# Patient Record
Sex: FEMALE | URBAN - METROPOLITAN AREA
[De-identification: names, ages, dates, MRNs, and addresses within clinical notes are randomized per-mention and may not be internally consistent; named-entity substitution may affect disease eponyms.]

---

## 2019-08-05 ENCOUNTER — APPOINTMENT (OUTPATIENT)
Dept: URBAN - METROPOLITAN AREA SURGERY 9 | Age: 59
Setting detail: DERMATOLOGY
End: 2019-08-05

## 2019-08-05 DIAGNOSIS — L65.9 NONSCARRING HAIR LOSS, UNSPECIFIED: ICD-10-CM

## 2019-08-05 PROCEDURE — OTHER TREATMENT REGIMEN: OTHER

## 2019-08-05 PROCEDURE — OTHER OTHER (SELF PAY): OTHER

## 2019-08-05 PROCEDURE — OTHER COUNSELING: OTHER

## 2019-08-05 ASSESSMENT — LOCATION ZONE DERM: LOCATION ZONE: SCALP

## 2019-08-05 ASSESSMENT — LOCATION SIMPLE DESCRIPTION DERM: LOCATION SIMPLE: POSTERIOR SCALP

## 2019-08-05 ASSESSMENT — LOCATION DETAILED DESCRIPTION DERM: LOCATION DETAILED: POSTERIOR MID-PARIETAL SCALP

## 2019-08-05 NOTE — PROCEDURE: TREATMENT REGIMEN
Initiate Treatment: Rogaine foam nightly
Continue Regimen: Viviscal and nioxin
Discontinue Regimen: Biotin if wanted by patient
Detail Level: Simple

## 2019-08-29 ENCOUNTER — APPOINTMENT (OUTPATIENT)
Dept: URBAN - METROPOLITAN AREA SURGERY 9 | Age: 59
Setting detail: DERMATOLOGY
End: 2019-08-30

## 2019-08-29 DIAGNOSIS — L65.9 NONSCARRING HAIR LOSS, UNSPECIFIED: ICD-10-CM

## 2019-08-29 PROCEDURE — OTHER PLATELET RICH PLASMA INJECTION: OTHER

## 2019-08-29 PROCEDURE — OTHER OTHER: OTHER

## 2019-08-29 ASSESSMENT — LOCATION ZONE DERM: LOCATION ZONE: SCALP

## 2019-08-29 ASSESSMENT — LOCATION SIMPLE DESCRIPTION DERM: LOCATION SIMPLE: SCALP

## 2019-08-29 ASSESSMENT — LOCATION DETAILED DESCRIPTION DERM: LOCATION DETAILED: LEFT SUPERIOR PARIETAL SCALP

## 2019-08-29 NOTE — PROCEDURE: PLATELET RICH PLASMA INJECTION
Consent: The following consent was reviewed with the patient. The patient written and verbally agrees to the following: \\n\\nPRP is not FDA approved for hair loss. Complications include but are not limited to the following: Bleeding, bruising, pain, itching, infection, or damage to deeper structures as nerves. \\n\\nThe patient understands that they may only have a partial response and in rare cases, could worsen hair loss. \\n\\nThe patient consents to pre and post photography for scientific and educational purposes. \\n\\nThe patient agrees to the cost of the kit even if the blood draw doesn’t result in a successful yield\\n\\nPatient understands the procedure is cosmetic in nature and will require out of pocket payment.

## 2019-08-29 NOTE — PROCEDURE: PLATELET RICH PLASMA INJECTION
Standard Default Technique For Making Prp: After 22 cc of blood was withdrawn from the patient using a butterfly needle, the tube of blood was gently inverted to mix in the anticoagulant. After, the test tube was centrifuged in the Eclipse single spin centrifuge for 10 minutes. Thereafter, the vent needle was placed in the test tube and the platelet poor plasma – approximately 6 cc - was extracted. The test tube was then inverted to mix in the platelets into the remaining plasma. Using a new syringe, the platelet rich plasma was extracted and transferred to two 3 cc syringes with a 30cc needle for injection. The PRP was injected subdermally into the affected area on the scalp in 0.1-0.5 mL aliquots. The Holland cooler was used as anesthesia with or without the Buzzy. Ice and massage was used post procedure.

## 2019-08-29 NOTE — PROCEDURE: PLATELET RICH PLASMA INJECTION
Post-Care Instructions: The following post care instructions were reviewed with the patient: \\n1.  Please take a shower and shampoo scalp after you return home. \\n2.  Drink 64 oz of water daily\\n3.  Do not color or treat hair for 24 hours\\n4.  Do not apply any creams to the affected area for 24 hours\\n5.  Avoid aspirin or NSAIDs for 48  hours if possible to minimize bruising\\n6.  Avoid any nutritional supplements such as Vitamin C, Vitamin E, Gingko Biloba, Garlic, or Ginseng\\n7.  Avoid alcohol for 48 hours to minimizing bruising\\n8.  Avoid massages to the area for 48 hours\\nPost care instruction sheet was given to the patient

## 2019-08-29 NOTE — PROCEDURE: OTHER
Detail Level: Detailed
Note Text (......Xxx Chief Complaint.): This diagnosis correlates with the
Other (Free Text): Lot: [id number]\\nExp: 04-\\n\\nPatient paid $2500 for 3 treatments today.

## 2019-08-29 NOTE — PROCEDURE: PLATELET RICH PLASMA INJECTION
Price (Use Numbers Only, No Special Characters Or $): 2410 Price (Use Numbers Only, No Special Characters Or $): 6656

## 2019-10-31 ENCOUNTER — APPOINTMENT (OUTPATIENT)
Dept: URBAN - METROPOLITAN AREA SURGERY 9 | Age: 59
Setting detail: DERMATOLOGY
End: 2019-10-31

## 2019-10-31 DIAGNOSIS — L65.9 NONSCARRING HAIR LOSS, UNSPECIFIED: ICD-10-CM

## 2019-10-31 PROCEDURE — OTHER PLATELET RICH PLASMA INJECTION: OTHER

## 2019-10-31 PROCEDURE — OTHER OTHER: OTHER

## 2019-10-31 ASSESSMENT — LOCATION DETAILED DESCRIPTION DERM: LOCATION DETAILED: LEFT SUPERIOR PARIETAL SCALP

## 2019-10-31 ASSESSMENT — LOCATION SIMPLE DESCRIPTION DERM: LOCATION SIMPLE: SCALP

## 2019-10-31 ASSESSMENT — LOCATION ZONE DERM: LOCATION ZONE: SCALP

## 2019-10-31 NOTE — PROCEDURE: OTHER
Other (Free Text): Lot: [id number]\\nExp: 04-\\n\\nPatient has already paid for 3 treatments. Other (Free Text): Lot: [id number]\\nExp: 04-\\n\\nPatient has already paid for 3 treatments.

## 2019-10-31 NOTE — PROCEDURE: PLATELET RICH PLASMA INJECTION
Depth In Mm (Will Not Render If 0): 0
Detail Level: Zone
Location #1: anterior/crown scalp
Location #2: alvin-lateral scalp
Amount Injected At This Location In Cc (Will Not Render If 0): 3
Show Additional Techniques: Yes
Post-Care Instructions: The following post care instructions were reviewed with the patient: \\n1.  Please take a shower and shampoo scalp after you return home. \\n2.  Drink 64 oz of water daily\\n3.  Do not color or treat hair for 24 hours\\n4.  Do not apply any creams to the affected area for 24 hours\\n5.  Avoid aspirin or NSAIDs for 48  hours if possible to minimize bruising\\n6.  Avoid any nutritional supplements such as Vitamin C, Vitamin E, Gingko Biloba, Garlic, or Ginseng\\n7.  Avoid alcohol for 48 hours to minimizing bruising\\n8.  Avoid massages to the area for 48 hours\\nPost care instruction sheet was given to the patient
Treatment Number (Optional): 2
Consent: The following consent was reviewed with the patient. The patient written and verbally agrees to the following: \\n\\nPRP is not FDA approved for hair loss. Complications include but are not limited to the following: Bleeding, bruising, pain, itching, infection, or damage to deeper structures as nerves. \\n\\nThe patient understands that they may only have a partial response and in rare cases, could worsen hair loss. \\n\\nThe patient consents to pre and post photography for scientific and educational purposes. \\n\\nThe patient agrees to the cost of the kit even if the blood draw doesn’t result in a successful yield\\n\\nPatient understands the procedure is cosmetic in nature and will require out of pocket payment.
Amount Injected At This Location In Cc (Will Not Render If 0): 3.5
Which Technique?: Default
Standard Default Technique For Making Prp: After 22 cc of blood was withdrawn from the patient using a butterfly needle, the tube of blood was gently inverted to mix in the anticoagulant. After, the test tube was centrifuged in the Eclipse single spin centrifuge for 10 minutes. Thereafter, the vent needle was placed in the test tube and the platelet poor plasma – approximately 6 cc - was extracted. The test tube was then inverted to mix in the platelets into the remaining plasma. Using a new syringe, the platelet rich plasma was extracted and transferred to two 3 cc syringes with a 30cc needle for injection. The PRP was injected subdermally into the affected area on the scalp in 0.1-0.5 mL aliquots. The Holland cooler was used as anesthesia with or without the Buzzy. Ice and massage was used post procedure.

## 2019-12-10 ENCOUNTER — APPOINTMENT (OUTPATIENT)
Dept: URBAN - METROPOLITAN AREA SURGERY 9 | Age: 59
Setting detail: DERMATOLOGY
End: 2019-12-10

## 2019-12-10 DIAGNOSIS — L65.9 NONSCARRING HAIR LOSS, UNSPECIFIED: ICD-10-CM

## 2019-12-10 PROCEDURE — OTHER OTHER: OTHER

## 2019-12-10 PROCEDURE — OTHER PLATELET RICH PLASMA INJECTION: OTHER

## 2019-12-10 ASSESSMENT — LOCATION ZONE DERM: LOCATION ZONE: SCALP

## 2019-12-10 ASSESSMENT — LOCATION SIMPLE DESCRIPTION DERM: LOCATION SIMPLE: SCALP

## 2019-12-10 ASSESSMENT — LOCATION DETAILED DESCRIPTION DERM: LOCATION DETAILED: LEFT SUPERIOR PARIETAL SCALP

## 2019-12-10 NOTE — PROCEDURE: PLATELET RICH PLASMA INJECTION
Amount Injected At This Location In Cc (Will Not Render If 0): 3.5
Depth In Mm (Will Not Render If 0): 0
Detail Level: Zone
Standard Default Technique For Making Prp: After 22 cc of blood was withdrawn from the patient using a butterfly needle, the tube of blood was gently inverted to mix in the anticoagulant. After, the test tube was centrifuged in the Eclipse single spin centrifuge for 10 minutes. Thereafter, the vent needle was placed in the test tube and the platelet poor plasma – approximately 6 cc - was extracted. The test tube was then inverted to mix in the platelets into the remaining plasma. Using a new syringe, the platelet rich plasma was extracted and transferred to two 3 cc syringes with a 30cc needle for injection. The PRP was injected subdermally into the affected area on the scalp in 0.1-0.5 mL aliquots. The Holland cooler was used as anesthesia with or without the Buzzy. Ice and massage was used post procedure.
Post-Care Instructions: The following post care instructions were reviewed with the patient: \\n1.  Please take a shower and shampoo scalp after you return home. \\n2.  Drink 64 oz of water daily\\n3.  Do not color or treat hair for 24 hours\\n4.  Do not apply any creams to the affected area for 24 hours\\n5.  Avoid aspirin or NSAIDs for 48  hours if possible to minimize bruising\\n6.  Avoid any nutritional supplements such as Vitamin C, Vitamin E, Gingko Biloba, Garlic, or Ginseng\\n7.  Avoid alcohol for 48 hours to minimizing bruising\\n8.  Avoid massages to the area for 48 hours\\nPost care instruction sheet was given to the patient
Location #2: alvin-lateral scalp
Consent: The following consent was reviewed with the patient. The patient written and verbally agrees to the following: \\n\\nPRP is not FDA approved for hair loss. Complications include but are not limited to the following: Bleeding, bruising, pain, itching, infection, or damage to deeper structures as nerves. \\n\\nThe patient understands that they may only have a partial response and in rare cases, could worsen hair loss. \\n\\nThe patient consents to pre and post photography for scientific and educational purposes. \\n\\nThe patient agrees to the cost of the kit even if the blood draw doesn’t result in a successful yield\\n\\nPatient understands the procedure is cosmetic in nature and will require out of pocket payment.
Show Additional Techniques: Yes
Which Technique?: Default
Treatment Number (Optional): 3
Location #1: anterior/crown scalp

## 2019-12-10 NOTE — PROCEDURE: OTHER
Other (Free Text): Lot: [id number]\\nExp: 04/2021\\n\\nPatient has already paid for 3 treatments. Other (Free Text): Lot: [id number]\\nExp: 04/2021\\n\\nPatient has already paid for 3 treatments.

## 2020-06-02 ENCOUNTER — APPOINTMENT (OUTPATIENT)
Dept: URBAN - METROPOLITAN AREA SURGERY 9 | Age: 60
Setting detail: DERMATOLOGY
End: 2020-06-02

## 2020-06-02 DIAGNOSIS — L64.8 OTHER ANDROGENIC ALOPECIA: ICD-10-CM

## 2020-06-02 PROCEDURE — 99213 OFFICE O/P EST LOW 20 MIN: CPT

## 2020-06-02 PROCEDURE — OTHER OTHER: OTHER

## 2020-06-02 PROCEDURE — OTHER TREATMENT REGIMEN: OTHER

## 2020-06-02 ASSESSMENT — LOCATION ZONE DERM: LOCATION ZONE: SCALP

## 2020-06-02 ASSESSMENT — LOCATION SIMPLE DESCRIPTION DERM: LOCATION SIMPLE: POSTERIOR SCALP

## 2020-06-02 ASSESSMENT — LOCATION DETAILED DESCRIPTION DERM: LOCATION DETAILED: POSTERIOR MID-PARIETAL SCALP

## 2020-06-02 NOTE — PROCEDURE: OTHER
Detail Level: Zone
Note Text (......Xxx Chief Complaint.): This diagnosis correlates with the
Other (Free Text): Discussed PRP treatments every 5-6 months for maintenance therapy.\\n\\nWe will schedule her for June 2020 for a PRP treatment

## 2020-06-18 ENCOUNTER — APPOINTMENT (OUTPATIENT)
Dept: URBAN - METROPOLITAN AREA SURGERY 9 | Age: 60
Setting detail: DERMATOLOGY
End: 2020-06-18

## 2020-06-18 DIAGNOSIS — L65.9 NONSCARRING HAIR LOSS, UNSPECIFIED: ICD-10-CM

## 2020-06-18 PROCEDURE — OTHER PLATELET RICH PLASMA INJECTION: OTHER

## 2020-06-18 PROCEDURE — OTHER OTHER: OTHER

## 2020-06-18 ASSESSMENT — LOCATION ZONE DERM: LOCATION ZONE: SCALP

## 2020-06-18 ASSESSMENT — LOCATION SIMPLE DESCRIPTION DERM: LOCATION SIMPLE: SCALP

## 2020-06-18 ASSESSMENT — LOCATION DETAILED DESCRIPTION DERM: LOCATION DETAILED: LEFT SUPERIOR PARIETAL SCALP

## 2020-06-18 NOTE — PROCEDURE: OTHER
Other (Free Text): Lot: [id number]\\nExp: 2022-01-05\\n\\nTreatment #4. Cosmetic fee of $1000 for today’s visit Other (Free Text): Lot: [id number]\\nExp: 2022-01-05\\n\\nTreatment #4. Cosmetic fee of $1000 for today’s visit

## 2020-06-18 NOTE — PROCEDURE: PLATELET RICH PLASMA INJECTION
Which Technique?: Default
Amount Injected At This Location In Cc (Will Not Render If 0): 0
Treatment Number (Optional): 4
Detail Level: Zone
Post-Care Instructions: The following post care instructions were reviewed with the patient: \\n1.  Please take a shower and shampoo scalp after you return home. \\n2.  Drink 64 oz of water daily\\n3.  Do not color or treat hair for 24 hours\\n4.  Do not apply any creams to the affected area for 24 hours\\n5.  Avoid aspirin or NSAIDs for 48  hours if possible to minimize bruising\\n6.  Avoid any nutritional supplements such as Vitamin C, Vitamin E, Gingko Biloba, Garlic, or Ginseng\\n7.  Avoid alcohol for 48 hours to minimizing bruising\\n8.  Avoid massages to the area for 48 hours\\nPost care instruction sheet was given to the patient
Location #2: alvin-lateral scalp
Consent: The following consent was reviewed with the patient. The patient written and verbally agrees to the following: \\n\\nPRP is not FDA approved for hair loss. Complications include but are not limited to the following: Bleeding, bruising, pain, itching, infection, or damage to deeper structures as nerves. \\n\\nThe patient understands that they may only have a partial response and in rare cases, could worsen hair loss. \\n\\nThe patient consents to pre and post photography for scientific and educational purposes. \\n\\nThe patient agrees to the cost of the kit even if the blood draw doesn’t result in a successful yield\\n\\nPatient understands the procedure is cosmetic in nature and will require out of pocket payment.
Show Additional Techniques: Yes
Amount Injected At This Location In Cc (Will Not Render If 0): 3.5
Price (Use Numbers Only, No Special Characters Or $): 1000
Location #1: anterior/crown scalp
Standard Default Technique For Making Prp: After 22 cc of blood was withdrawn from the patient using a butterfly needle, the tube of blood was gently inverted to mix in the anticoagulant. After, the test tube was centrifuged in the Eclipse single spin centrifuge for 10 minutes. Thereafter, the vent needle was placed in the test tube and the platelet poor plasma – approximately 6 cc - was extracted. The test tube was then inverted to mix in the platelets into the remaining plasma. Using a new syringe, the platelet rich plasma was extracted and transferred to two 3 cc syringes with a 30cc needle for injection. The PRP was injected subdermally into the affected area on the scalp in 0.1-0.5 mL aliquots. The Holland cooler was used as anesthesia with or without the Buzzy. Ice and massage was used post procedure.

## 2020-06-18 NOTE — HPI: PROCEDURE (INJECTION)
Have You Had This Injection Before?: has been previously injected
Additional History: Treatment #4.
When Was Your Last Injection?: 12/2019
How Many Times?: 3

## 2020-11-19 ENCOUNTER — APPOINTMENT (OUTPATIENT)
Dept: URBAN - METROPOLITAN AREA SURGERY 9 | Age: 60
Setting detail: DERMATOLOGY
End: 2020-11-20

## 2020-11-19 DIAGNOSIS — L65.9 NONSCARRING HAIR LOSS, UNSPECIFIED: ICD-10-CM

## 2020-11-19 PROCEDURE — OTHER OTHER: OTHER

## 2020-11-19 PROCEDURE — OTHER PLATELET RICH PLASMA INJECTION: OTHER

## 2020-11-19 ASSESSMENT — LOCATION ZONE DERM: LOCATION ZONE: SCALP

## 2020-11-19 ASSESSMENT — LOCATION SIMPLE DESCRIPTION DERM: LOCATION SIMPLE: SCALP

## 2020-11-19 ASSESSMENT — LOCATION DETAILED DESCRIPTION DERM: LOCATION DETAILED: LEFT SUPERIOR PARIETAL SCALP

## 2020-11-19 NOTE — PROCEDURE: PLATELET RICH PLASMA INJECTION
Which Technique?: Default
Amount Injected At This Location In Cc (Will Not Render If 0): 0
Treatment Number (Optional): 5
Detail Level: Zone
Post-Care Instructions: The following post care instructions were reviewed with the patient: \\n1.  Please take a shower and shampoo scalp after you return home. \\n2.  Drink 64 oz of water daily\\n3.  Do not color or treat hair for 24 hours\\n4.  Do not apply any creams to the affected area for 24 hours\\n5.  Avoid aspirin or NSAIDs for 48  hours if possible to minimize bruising\\n6.  Avoid any nutritional supplements such as Vitamin C, Vitamin E, Gingko Biloba, Garlic, or Ginseng\\n7.  Avoid alcohol for 48 hours to minimizing bruising\\n8.  Avoid massages to the area for 48 hours\\nPost care instruction sheet was given to the patient
Location #2: alvin-lateral scalp
Consent: The following consent was reviewed with the patient. The patient written and verbally agrees to the following: \\n\\nPRP is not FDA approved for hair loss. Complications include but are not limited to the following: Bleeding, bruising, pain, itching, infection, or damage to deeper structures as nerves. \\n\\nThe patient understands that they may only have a partial response and in rare cases, could worsen hair loss. \\n\\nThe patient consents to pre and post photography for scientific and educational purposes. \\n\\nThe patient agrees to the cost of the kit even if the blood draw doesn’t result in a successful yield\\n\\nPatient understands the procedure is cosmetic in nature and will require out of pocket payment.
Show Additional Techniques: Yes
Amount Injected At This Location In Cc (Will Not Render If 0): 3.5
Price (Use Numbers Only, No Special Characters Or $): 1000
Location #1: anterior/crown scalp
Standard Default Technique For Making Prp: After 22 cc of blood was withdrawn from the patient using a butterfly needle, the tube of blood was gently inverted to mix in the anticoagulant. After, the test tube was centrifuged in the Eclipse single spin centrifuge for 10 minutes. Thereafter, the vent needle was placed in the test tube and the platelet poor plasma – approximately 6 cc - was extracted. The test tube was then inverted to mix in the platelets into the remaining plasma. Using a new syringe, the platelet rich plasma was extracted and transferred to two 3 cc syringes with a 30cc needle for injection. The PRP was injected subdermally into the affected area on the scalp in 0.1-0.5 mL aliquots. The Holland cooler was used as anesthesia with or without the Buzzy. Ice and massage was used post procedure.

## 2020-11-19 NOTE — PROCEDURE: OTHER
Other (Free Text): Lot: [id number]\\nExp: 02-\\n\\nTreatment #5. Cosmetic fee of $1000 for today’s visit Other (Free Text): Lot: [id number]\\nExp: 02-\\n\\nTreatment #5. Cosmetic fee of $1000 for today’s visit

## 2021-05-13 ENCOUNTER — APPOINTMENT (OUTPATIENT)
Dept: URBAN - METROPOLITAN AREA SURGERY 9 | Age: 61
Setting detail: DERMATOLOGY
End: 2021-05-13

## 2021-05-13 DIAGNOSIS — L65.9 NONSCARRING HAIR LOSS, UNSPECIFIED: ICD-10-CM

## 2021-05-13 PROCEDURE — OTHER PLATELET RICH PLASMA INJECTION: OTHER

## 2021-05-13 PROCEDURE — OTHER OTHER: OTHER

## 2021-05-13 ASSESSMENT — LOCATION DETAILED DESCRIPTION DERM: LOCATION DETAILED: LEFT SUPERIOR PARIETAL SCALP

## 2021-05-13 ASSESSMENT — LOCATION ZONE DERM: LOCATION ZONE: SCALP

## 2021-05-13 ASSESSMENT — LOCATION SIMPLE DESCRIPTION DERM: LOCATION SIMPLE: SCALP

## 2021-05-13 NOTE — PROCEDURE: OTHER
Other (Free Text): Lot: [id number]\\nExp: 12/13/2022\\n\\nTreatment #6. Cosmetic fee of $1000 for today’s visit Other (Free Text): Lot: [id number]\\nExp: 12/13/2022\\n\\nTreatment #6. Cosmetic fee of $1000 for today’s visit

## 2021-05-13 NOTE — PROCEDURE: PLATELET RICH PLASMA INJECTION
Which Technique?: Default
Amount Injected At This Location In Cc (Will Not Render If 0): 0
Treatment Number (Optional): 6
Detail Level: Zone
Post-Care Instructions: The following post care instructions were reviewed with the patient: \\n1.  Please take a shower and shampoo scalp after you return home. \\n2.  Drink 64 oz of water daily\\n3.  Do not color or treat hair for 24 hours\\n4.  Do not apply any creams to the affected area for 24 hours\\n5.  Avoid aspirin or NSAIDs for 48  hours if possible to minimize bruising\\n6.  Avoid any nutritional supplements such as Vitamin C, Vitamin E, Gingko Biloba, Garlic, or Ginseng\\n7.  Avoid alcohol for 48 hours to minimizing bruising\\n8.  Avoid massages to the area for 48 hours\\nPost care instruction sheet was given to the patient
Location #2: alvin-lateral scalp
Consent: The following consent was reviewed with the patient. The patient written and verbally agrees to the following: \\n\\nPRP is not FDA approved for hair loss. Complications include but are not limited to the following: Bleeding, bruising, pain, itching, infection, or damage to deeper structures as nerves. \\n\\nThe patient understands that they may only have a partial response and in rare cases, could worsen hair loss. \\n\\nThe patient consents to pre and post photography for scientific and educational purposes. \\n\\nThe patient agrees to the cost of the kit even if the blood draw doesn’t result in a successful yield\\n\\nPatient understands the procedure is cosmetic in nature and will require out of pocket payment.
Show Additional Techniques: Yes
Amount Injected At This Location In Cc (Will Not Render If 0): 4
Price (Use Numbers Only, No Special Characters Or $): 1000
Location #1: anterior/crown scalp
Standard Default Technique For Making Prp: After 22 cc of blood was withdrawn from the patient using a butterfly needle, the tube of blood was gently inverted to mix in the anticoagulant. After, the test tube was centrifuged in the Eclipse single spin centrifuge for 10 minutes. Thereafter, the vent needle was placed in the test tube and the platelet poor plasma – approximately 6 cc - was extracted. The test tube was then inverted to mix in the platelets into the remaining plasma. Using a new syringe, the platelet rich plasma was extracted and transferred to two 3 cc syringes with a 30cc needle for injection. The PRP was injected subdermally into the affected area on the scalp in 0.1-0.5 mL aliquots. The Holland cooler was used as anesthesia with or without the Buzzy. Ice and massage was used post procedure.

## 2021-11-11 ENCOUNTER — APPOINTMENT (OUTPATIENT)
Dept: URBAN - METROPOLITAN AREA SURGERY 9 | Age: 61
Setting detail: DERMATOLOGY
End: 2021-11-11

## 2022-06-09 ENCOUNTER — APPOINTMENT (OUTPATIENT)
Dept: URBAN - METROPOLITAN AREA SURGERY 9 | Age: 62
Setting detail: DERMATOLOGY
End: 2022-06-09

## 2022-06-09 DIAGNOSIS — L65.9 NONSCARRING HAIR LOSS, UNSPECIFIED: ICD-10-CM

## 2022-06-09 PROCEDURE — OTHER PLATELET RICH PLASMA INJECTION: OTHER

## 2022-06-09 PROCEDURE — OTHER OTHER: OTHER

## 2022-06-09 ASSESSMENT — LOCATION SIMPLE DESCRIPTION DERM: LOCATION SIMPLE: SCALP

## 2022-06-09 ASSESSMENT — LOCATION ZONE DERM: LOCATION ZONE: SCALP

## 2022-06-09 ASSESSMENT — LOCATION DETAILED DESCRIPTION DERM: LOCATION DETAILED: LEFT SUPERIOR PARIETAL SCALP

## 2022-06-09 NOTE — PROCEDURE: PLATELET RICH PLASMA INJECTION
Which Technique?: Default
Amount Injected At This Location In Cc (Will Not Render If 0): 0
Treatment Number (Optional): 7
Detail Level: Zone
Post-Care Instructions: The following post care instructions were reviewed with the patient: \\n1.  Please take a shower and shampoo scalp after you return home. \\n2.  Drink 64 oz of water daily\\n3.  Do not color or treat hair for 24 hours\\n4.  Do not apply any creams to the affected area for 24 hours\\n5.  Avoid aspirin or NSAIDs for 48  hours if possible to minimize bruising\\n6.  Avoid any nutritional supplements such as Vitamin C, Vitamin E, Gingko Biloba, Garlic, or Ginseng\\n7.  Avoid alcohol for 48 hours to minimizing bruising\\n8.  Avoid massages to the area for 48 hours\\nPost care instruction sheet was given to the patient
Location #2: alvin-lateral scalp
Consent: The following consent was reviewed with the patient. The patient written and verbally agrees to the following: \\n\\nPRP is not FDA approved for hair loss. Complications include but are not limited to the following: Bleeding, bruising, pain, itching, infection, or damage to deeper structures as nerves. \\n\\nThe patient understands that they may only have a partial response and in rare cases, could worsen hair loss. \\n\\nThe patient consents to pre and post photography for scientific and educational purposes. \\n\\nThe patient agrees to the cost of the kit even if the blood draw doesn’t result in a successful yield\\n\\nPatient understands the procedure is cosmetic in nature and will require out of pocket payment.
Show Additional Techniques: Yes
Amount Injected At This Location In Cc (Will Not Render If 0): 3.5
Location #1: anterior/crown scalp
Standard Default Technique For Making Prp: After 22 cc of blood was withdrawn from the patient using a butterfly needle, the tube of blood was gently inverted to mix in the anticoagulant. After, the test tube was centrifuged in the Eclipse single spin centrifuge for 10 minutes. Thereafter, the vent needle was placed in the test tube and the platelet poor plasma – approximately 6 cc - was extracted. The test tube was then inverted to mix in the platelets into the remaining plasma. Using a new syringe, the platelet rich plasma was extracted and transferred to two 3 cc syringes with a 30cc needle for injection. The PRP was injected subdermally into the affected area on the scalp in 0.1-0.5 mL aliquots. The Holland cooler was used as anesthesia with or without the Buzzy. Ice and massage was used post procedure.
Amount Injected At This Location In Cc (Will Not Render If 0): 4
External Cooling Fan Speed: 5
Venipuncture Paragraph: An alcohol pad was applied to the venipuncture site. Venipuncture was performed using a butterfly needle. Pressure and a bandaid was applied to the site. No complications were noted.

## 2022-06-09 NOTE — PROCEDURE: OTHER
Other (Free Text): Lot: [id number]\\nExp: 12/13/2022\\n\\nTreatment #6. Cosmetic fee of $1000 for today’s visit Other (Free Text): Lot: [id number]\\nExp: 12/13/2022\\n\\nTreatment #6. Cosmetic fee of $1000 for today’s visit